# Patient Record
Sex: MALE
[De-identification: names, ages, dates, MRNs, and addresses within clinical notes are randomized per-mention and may not be internally consistent; named-entity substitution may affect disease eponyms.]

---

## 2017-07-14 PROBLEM — Z00.00 ENCOUNTER FOR PREVENTIVE HEALTH EXAMINATION: Status: ACTIVE | Noted: 2017-07-14

## 2023-12-22 ENCOUNTER — NON-APPOINTMENT (OUTPATIENT)
Age: 28
End: 2023-12-22

## 2023-12-26 ENCOUNTER — NON-APPOINTMENT (OUTPATIENT)
Age: 28
End: 2023-12-26

## 2023-12-27 ENCOUNTER — APPOINTMENT (OUTPATIENT)
Dept: ORTHOPEDIC SURGERY | Facility: CLINIC | Age: 28
End: 2023-12-27
Payer: COMMERCIAL

## 2023-12-27 ENCOUNTER — NON-APPOINTMENT (OUTPATIENT)
Age: 28
End: 2023-12-27

## 2023-12-27 VITALS
WEIGHT: 170 LBS | DIASTOLIC BLOOD PRESSURE: 65 MMHG | HEART RATE: 52 BPM | HEIGHT: 67 IN | SYSTOLIC BLOOD PRESSURE: 105 MMHG | BODY MASS INDEX: 26.68 KG/M2

## 2023-12-27 DIAGNOSIS — S93.402A SPRAIN OF UNSPECIFIED LIGAMENT OF LEFT ANKLE, INITIAL ENCOUNTER: ICD-10-CM

## 2023-12-27 PROCEDURE — 99203 OFFICE O/P NEW LOW 30 MIN: CPT

## 2023-12-27 NOTE — HISTORY OF PRESENT ILLNESS
[de-identified] : EVAN OPPENHEIMER  is a 28 year old M who presents with a left ankle injury.  He was playing basketball on Saturday.  He does not remember specific injury but says that afterward he developed pain and swelling in his ankle.  Of note he recently had ACL surgery last year on his right knee and thinks that he was trying to protect the right knee by putting more weight on the left side and thinks that this may have caused an injury.  He says that he went to the urgent care where x-rays were negative for any fracture.  He was put into a splint and told to follow-up.  He says that since then the pain and swelling has improved.  He took off the splint and has been walking in regular shoes.  He says the pain is about a 4-5 out of 10, mostly on the lateral aspect of the ankle.  He has been elevating and icing the ankle as well.

## 2023-12-27 NOTE — PHYSICAL EXAM
[de-identified] : Constitutional: Well-nourished, well-developed, No acute distress  Respiratory:  Good respiratory effort, no SOB  Psychiatric: Pleasant and normal affect, alert and oriented x3   Musculoskeletal:  Left Ankle:  + Moderate swelling of the anterolateral ankle and foot, no marked deformities  or malalignment  Skin: moderate swelling about lateral ankle NONTENDER: medial malleolus, lateral malleolus, tibialis posterior tendon, achilles tendon, no marked thickening of tendon, PTFL, anterior tibiofibular ligament (high ankle), sinus tarsus, deltoid ligaments Tender to palpation about lateral ankle ligaments Motor: 5/5 EHL/FHL/TA/GS Sensory: SILT s/s/sp/dp/t distributions  ROM:  Mild limitation of PF and DF as well as Eversion and Inversion due to pain and swelling Mild pain with resisted eversion and DF.  painless resisted  plantar flexion, and inversion.   Anterior Drawer: Negative without laxity Talar Tilt: Negative without laxity Squeeze Test: negative  Right Ankle: Skin: warm, dry, no swelling or edema no TTP about ankle Motor: 5/5 EHL/FHL/TA/GS Sensory: SILT s/s/sp/dp/t distributions  ROM:  Dorsiflexion: 30 Plantarflexion: 30 Eversion: no pain Inversion: no pain  Anterior Drawer: negative Talar Tilt:negative Squeeze Test: negative [de-identified] : X-rays of the left ankle obtained in the urgent care demonstrate no acute fracture or dislocation.  There is soft tissue swelling about the lateral malleolus

## 2023-12-27 NOTE — DISCUSSION/SUMMARY
[de-identified] : Discussion was had with the patient regarding His diagnosis and treatment plan.  He has a low ankle sprain of the left ankle, which involves the lateral ankle ligaments.  Discussed that typically these can be treated without surgery in most cases.  Nonoperative management would include the use of a lace up ankle brace for 2 to 3 weeks.  I recommend ice and elevation of the ankle while not mobilizing.  Overall I would like him to be mobile and not bedrest. Ankle exercises were also given to help with proprioceptive training of the ankle.  After 3 weeks, he may return to full activity if he does not have any pain or swelling in the ankle.  If he does continue to have pain and swelling, he should come back and see me for repeat evaluation.  Of note, he should wait to return to sports until he is cleared by his surgeon for his right knee ACL.  I have personally obtained the history, reviewed the ROS as noted, and performed the physical examination today. The patient and I discussed the assessment and options and developed the plan. All questions were answered and the patient stated their understanding of the treatment plan and appreciation of the visit.  My cumulative time spent on this patient's visit included: Preparation for the visit, review of the medical records, review of pertinent diagnostic studies, examination and counseling of the patient on the above diagnosis, treatment plan and prognosis, orders of diagnostic tests, medications and/or appropriate procedures and documentation in the medical records of today's visit.  This note was generated using dragon medical dictation software.  A reasonable effort has been made for proofreading its contents, but typos may still remain.  If there are any questions or points of clarification needed please notify my office.

## 2024-05-02 ENCOUNTER — NON-APPOINTMENT (OUTPATIENT)
Age: 29
End: 2024-05-02

## 2024-11-02 ENCOUNTER — NON-APPOINTMENT (OUTPATIENT)
Age: 29
End: 2024-11-02

## 2025-02-12 ENCOUNTER — NON-APPOINTMENT (OUTPATIENT)
Age: 30
End: 2025-02-12

## 2025-02-13 ENCOUNTER — APPOINTMENT (OUTPATIENT)
Dept: ORTHOPEDIC SURGERY | Facility: CLINIC | Age: 30
End: 2025-02-13
Payer: MEDICAID

## 2025-02-13 VITALS — HEIGHT: 67 IN | BODY MASS INDEX: 26.68 KG/M2 | WEIGHT: 170 LBS

## 2025-02-13 DIAGNOSIS — G89.29 PAIN IN RIGHT HIP: ICD-10-CM

## 2025-02-13 DIAGNOSIS — M25.851 OTHER SPECIFIED JOINT DISORDERS, RIGHT HIP: ICD-10-CM

## 2025-02-13 DIAGNOSIS — M25.551 PAIN IN RIGHT HIP: ICD-10-CM

## 2025-02-13 PROCEDURE — 99215 OFFICE O/P EST HI 40 MIN: CPT | Mod: 25

## 2025-02-13 PROCEDURE — 72170 X-RAY EXAM OF PELVIS: CPT

## 2025-02-14 PROBLEM — M25.851 FEMOROACETABULAR IMPINGEMENT OF RIGHT HIP: Status: ACTIVE | Noted: 2025-02-14

## 2025-02-21 ENCOUNTER — NON-APPOINTMENT (OUTPATIENT)
Age: 30
End: 2025-02-21

## 2025-02-27 ENCOUNTER — APPOINTMENT (OUTPATIENT)
Dept: ORTHOPEDIC SURGERY | Facility: CLINIC | Age: 30
End: 2025-02-27

## 2025-03-27 ENCOUNTER — NON-APPOINTMENT (OUTPATIENT)
Age: 30
End: 2025-03-27

## 2025-07-08 ENCOUNTER — TRANSCRIPTION ENCOUNTER (OUTPATIENT)
Age: 30
End: 2025-07-08